# Patient Record
Sex: FEMALE | Race: WHITE
[De-identification: names, ages, dates, MRNs, and addresses within clinical notes are randomized per-mention and may not be internally consistent; named-entity substitution may affect disease eponyms.]

---

## 2022-06-22 ENCOUNTER — HOSPITAL ENCOUNTER (OUTPATIENT)
Dept: HOSPITAL 47 - RADUSWWP | Age: 87
Discharge: HOME | End: 2022-06-22
Attending: FAMILY MEDICINE
Payer: MEDICARE

## 2022-06-22 DIAGNOSIS — R22.42: Primary | ICD-10-CM

## 2022-06-22 NOTE — US
EXAMINATION TYPE: US venous doppler duplex LE LT

 

DATE OF EXAM: 6/22/2022 12:56 PM

 

COMPARISON: NONE

 

CLINICAL HISTORY: R22.42 SWELLING, MASS AND LUMP LT LOWER LIMB. Left leg swelling x 6 months, patient
 taking aspirin

 

SIDE PERFORMED: Left  

 

TECHNIQUE:  The lower extremity deep venous system is examined utilizing real time linear array sonog
yasmin with graded compression, doppler sonography and color-flow sonography.

 

VESSELS IMAGED:

Common Femoral Vein

Deep Femoral Vein

Greater Saphenous Vein *

Femoral Vein

Popliteal Vein

Small Saphenous Vein *

Proximal Calf Veins

(* superficial vessels)

 

 

 

Left Leg:  Appears negative for DVT

 

 

 

IMPRESSION: No evidence for DVT.

## 2023-06-01 ENCOUNTER — HOSPITAL ENCOUNTER (EMERGENCY)
Dept: HOSPITAL 47 - EC | Age: 88
Discharge: ANOTHER HOSPITAL | End: 2023-06-01
Payer: MEDICARE

## 2023-06-01 VITALS — RESPIRATION RATE: 18 BRPM

## 2023-06-01 VITALS — SYSTOLIC BLOOD PRESSURE: 122 MMHG | DIASTOLIC BLOOD PRESSURE: 76 MMHG | HEART RATE: 84 BPM

## 2023-06-01 VITALS — TEMPERATURE: 98.7 F

## 2023-06-01 DIAGNOSIS — R41.82: ICD-10-CM

## 2023-06-01 DIAGNOSIS — I16.1: ICD-10-CM

## 2023-06-01 DIAGNOSIS — J96.00: ICD-10-CM

## 2023-06-01 DIAGNOSIS — I63.9: Primary | ICD-10-CM

## 2023-06-01 DIAGNOSIS — R29.712: ICD-10-CM

## 2023-06-01 LAB
ALBUMIN SERPL-MCNC: 4.3 G/DL (ref 3.5–5)
ALP SERPL-CCNC: 75 U/L (ref 38–126)
ALT SERPL-CCNC: 48 U/L (ref 4–34)
ANION GAP SERPL CALC-SCNC: 10 MMOL/L
APTT BLD: 22.7 SEC (ref 22–30)
AST SERPL-CCNC: 50 U/L (ref 14–36)
BASOPHILS # BLD AUTO: 0 K/UL (ref 0–0.2)
BASOPHILS NFR BLD AUTO: 0 %
BUN SERPL-SCNC: 18 MG/DL (ref 7–17)
CALCIUM SPEC-MCNC: 9.6 MG/DL (ref 8.4–10.2)
CHLORIDE SERPL-SCNC: 101 MMOL/L (ref 98–107)
CK SERPL-CCNC: 49 U/L (ref 30–135)
CO2 SERPL-SCNC: 25 MMOL/L (ref 22–30)
EOSINOPHIL # BLD AUTO: 0.1 K/UL (ref 0–0.7)
EOSINOPHIL NFR BLD AUTO: 1 %
ERYTHROCYTE [DISTWIDTH] IN BLOOD BY AUTOMATED COUNT: 4.75 M/UL (ref 3.8–5.4)
ERYTHROCYTE [DISTWIDTH] IN BLOOD: 12.9 % (ref 11.5–15.5)
GLUCOSE BLD-MCNC: 173 MG/DL (ref 70–110)
GLUCOSE BLD-MCNC: 203 MG/DL (ref 70–110)
GLUCOSE SERPL-MCNC: 187 MG/DL (ref 74–99)
HCT VFR BLD AUTO: 45.8 % (ref 34–46)
HGB BLD-MCNC: 15 GM/DL (ref 11.4–16)
INR PPP: 1.1 (ref ?–1.2)
LYMPHOCYTES # SPEC AUTO: 3.2 K/UL (ref 1–4.8)
LYMPHOCYTES NFR SPEC AUTO: 34 %
MCH RBC QN AUTO: 31.7 PG (ref 25–35)
MCHC RBC AUTO-ENTMCNC: 32.8 G/DL (ref 31–37)
MCV RBC AUTO: 96.6 FL (ref 80–100)
MONOCYTES # BLD AUTO: 0.5 K/UL (ref 0–1)
MONOCYTES NFR BLD AUTO: 6 %
NEUTROPHILS # BLD AUTO: 5.4 K/UL (ref 1.3–7.7)
NEUTROPHILS NFR BLD AUTO: 57 %
PLATELET # BLD AUTO: 193 K/UL (ref 150–450)
POTASSIUM SERPL-SCNC: 3.4 MMOL/L (ref 3.5–5.1)
PROT SERPL-MCNC: 7.9 G/DL (ref 6.3–8.2)
PT BLD: 11.5 SEC (ref 9–12)
SODIUM SERPL-SCNC: 136 MMOL/L (ref 137–145)
WBC # BLD AUTO: 9.6 K/UL (ref 3.8–10.6)

## 2023-06-01 PROCEDURE — 36415 COLL VENOUS BLD VENIPUNCTURE: CPT

## 2023-06-01 PROCEDURE — 70498 CT ANGIOGRAPHY NECK: CPT

## 2023-06-01 PROCEDURE — 80053 COMPREHEN METABOLIC PANEL: CPT

## 2023-06-01 PROCEDURE — 85610 PROTHROMBIN TIME: CPT

## 2023-06-01 PROCEDURE — 99292 CRITICAL CARE ADDL 30 MIN: CPT

## 2023-06-01 PROCEDURE — 82550 ASSAY OF CK (CPK): CPT

## 2023-06-01 PROCEDURE — 31500 INSERT EMERGENCY AIRWAY: CPT

## 2023-06-01 PROCEDURE — 93005 ELECTROCARDIOGRAM TRACING: CPT

## 2023-06-01 PROCEDURE — 85025 COMPLETE CBC W/AUTO DIFF WBC: CPT

## 2023-06-01 PROCEDURE — 71045 X-RAY EXAM CHEST 1 VIEW: CPT

## 2023-06-01 PROCEDURE — 96375 TX/PRO/DX INJ NEW DRUG ADDON: CPT

## 2023-06-01 PROCEDURE — 70496 CT ANGIOGRAPHY HEAD: CPT

## 2023-06-01 PROCEDURE — 96365 THER/PROPH/DIAG IV INF INIT: CPT

## 2023-06-01 PROCEDURE — 70450 CT HEAD/BRAIN W/O DYE: CPT

## 2023-06-01 PROCEDURE — 96361 HYDRATE IV INFUSION ADD-ON: CPT

## 2023-06-01 PROCEDURE — 85730 THROMBOPLASTIN TIME PARTIAL: CPT

## 2023-06-01 PROCEDURE — 99291 CRITICAL CARE FIRST HOUR: CPT

## 2023-06-01 PROCEDURE — 37195 THROMBOLYTIC THERAPY STROKE: CPT

## 2023-06-01 PROCEDURE — 84484 ASSAY OF TROPONIN QUANT: CPT

## 2023-06-01 PROCEDURE — 94002 VENT MGMT INPAT INIT DAY: CPT

## 2023-06-01 RX ADMIN — SODIUM CHLORIDE ONE ML: 900 INJECTION INTRAVENOUS at 14:20

## 2023-06-01 RX ADMIN — SODIUM CHLORIDE ONE: 900 INJECTION INTRAVENOUS at 15:10

## 2023-06-01 NOTE — CT
EXAMINATION TYPE: CT angio head neck

 

DATE OF EXAM: 6/1/2023

 

COMPARISON: CT brain same day

 

HISTORY: 88-year-old female with neurologic deficit, acute, stroke suspected

 

TECHNIQUE: Contiguous axial scanning of the head and neck performed with IV Contrast, patient injecte
d with 75 mL of Isovue 370. Coronal/sagittal MIP reconstructions performed. 3-D reconstructions gener
ated on a dedicated independent workstation.

 

CT DLP: 1669. 4 mGycm

Automated exposure control for dose reduction was used.

 

FINDINGS: 

NECK:

Heart appears mildly enlarged. 1.3 cm right paratracheal node. Some additional borderline size nonenl
arged AP window nodes.

 

Aberrant direct takeoff of the left vertebral artery directly from the aortic arch.

 

The vertebral arteries appear codominant. There is loss of opacification of the upper V3 segment righ
t vertebral artery. Etiology is unclear. This appears to be a gradual loss but without any proximal h
igh-grade stenosis seen. Early take off of the left PICA. The V4 segment left vertebral artery become
s hypoplastic. Possible contribution to the right PICA from the left side.

 

Right common carotid artery is patent. Tortuosity of the proximal right common carotid artery.

Carotid bifurcation is patent. Retropharyngeal course right ICA. Right ICA is patent. NASCET criteria
 was utilized.

 

Left common and left internal carotid arteries are patent.

 

HEAD:

Again, hypoplastic V4 left vertebral artery. There may be a severe stenosis distal V4 left vertebral 
artery. Nonvisualization of the right vertebral artery.

 

Basilar artery is patent. Hypoplastic P1 segment left PCA. Persistent fetal origin left PCA, diffusel
y diminutive but probably patent. 

 

Possible cut off after the proximal portion of the P2 segment right posterior cerebral artery, axial 
image 136.

 

Mild atherosclerotic calcifications in the carotid siphons. There is high-grade stenosis at the bilat
eral M1 segments of the middle cerebral arteries. Diminutive M2 and more distal branches are visualiz
ed on both sides. 3 mm nodular prominence supraclinoid right ICA along the communicating segment.

 

 

 

IMPRESSION: 

THE UPPER CERVICAL RIGHT VERTEBRAL ARTERY, DISTAL V4 SEGMENT LEFT VERTEBRAL ARTERY, AND RIGHT PCA ARE
 MOST CONCERNING FOR ACUTE FINDINGS. CONSIDER THROMBOEMBOLIC PHENOMENON.  RECOMMEND MRI.

 

NECK:

1. GRADUAL LOSS OF OPACIFICATION OF THE UPPER V3 SEGMENT RIGHT VERTEBRAL ARTERY IN THE NECK. 

2. VARIANT DIRECT TAKEOFF OF THE LEFT VERTEBRAL ARTERY DIRECTLY FROM THE AORTIC ARCH.

3. NO SIGNIFICANT STENOSIS OF EITHER COMMON OR INTERNAL CAROTID ARTERIES IN THE NECK. 

 

HEAD:

4. NONVISUALIZATION OF THE V4 SEGMENT RIGHT VERTEBRAL ARTERY SUGGESTING OCCLUSION. THE LEFT VERTEBRAL
 ARTERY BECOMES HYPOPLASTIC AND MAY HAVE A SEVERE DISTAL STENOSIS PRIOR TO THE BASILAR ARTERY. THE RI
GHT PICA MAY HAVE A CONTRIBUTION FROM THE LEFT SIDE.

5. WHILE THE BASILAR ARTERY IS PATENT, THERE IS A SEVERELY DIMINUTIVE PERSISTENT FETAL ORIGIN LEFT PC
A. HOWEVER, FINDINGS ARE CONCERNING FOR RIGHT PCA OCCLUSION JUST AFTER THE P1 SEGMENT. 

6. IN ADDITION, THERE APPEAR TO BE HIGH-GRADE STENOSES AT THE M1 SEGMENTS OF THE BILATERAL MCA's. UNC
LEAR IF THIS IS A CHRONIC FINDING AS THE M2 AND MORE DISTAL BRANCHES ARE VISUALIZED, SEVERELY DIMINUT
EUGENE IN CALIBER.

7. POSSIBLE 3 MM ANEURYSM POSTERIORLY FROM THE SUPRACLINOID RIGHT ICA LIKELY ALONG THE COMMUNICATING 
SEGMENT.

 

CALLED TO THE ER at 1:52 PM.

## 2023-06-01 NOTE — ED
Neuro HPI





- General


Stated Complaint: neuro issues


Time Seen by Provider: 06/01/23 12:44


Source: RN notes reviewed, old records reviewed, Caregiver


Mode of arrival: EMS


Limitations: no limitations





- History of Present Illness


Is the patient presenting with stroke symptoms?: Yes


-: minutes(s)


Initial Comments: 





This is a 88-year-old female to the emergency department today.  Patient's 

brought in by EMS stroke symptoms.  Patient has significant left-sided deficit. 

Elevated blood pressure.  Altered mental status.  No history of stroke.  No 

trauma history.  No blood thinners per history, patient's a poor strain 

currently secondary to clinical condition, history obtained from EMS


Location: speech, dysarthria, left arm, left leg


Place: home


Severity: moderate


Quality: weak, numb, tingling, constant


Worsens With: none


On Anticoagulants: No


Associated Symptoms: confusion, weakness


Treatments Prior to Arrival: none





- Related Data


Allergies/Adverse Reactions: 


                                    Allergies











Allergy/AdvReac Type Severity Reaction Status Date / Time


 


No Known Allergies Allergy   Verified 06/01/23 12:50














Review of Systems


ROS Statement: 


Those systems with pertinent positive or pertinent negative responses have been 

documented in the HPI.





ROS Other: All systems not noted in ROS Statement are negative.





General Exam





- General Exam Comments


Initial Comments: 





NIH 12


Limitations: altered mental status


General appearance: alert, anxious, lethargic, obtunded


Head exam: Present: atraumatic, normocephalic, normal inspection


Eye exam: Present: normal appearance, PERRL, EOMI.  Absent: scleral icterus, 

conjunctival injection, periorbital swelling


ENT exam: Present: normal exam, mucous membranes moist


Neck exam: Present: normal inspection.  Absent: tenderness, meningismus, 

lymphadenopathy


Respiratory exam: Present: normal lung sounds bilaterally.  Absent: respiratory 

distress, wheezes, rales, rhonchi, stridor


Cardiovascular Exam: Present: irregular rhythm, normal heart sounds.  Absent: 

systolic murmur, diastolic murmur, rubs, gallop, clicks


GI/Abdominal exam: Present: soft, normal bowel sounds.  Absent: distended, 

tenderness, guarding, rebound, rigid


Extremities exam: Present: normal inspection, full ROM, normal capillary refill.

 Absent: tenderness, pedal edema, joint swelling, calf tenderness


Back exam: Present: normal inspection


Neurological exam: Present: altered, oriented X3, CN II-XII intact


Psychiatric exam: Present: depressed


Skin exam: Present: warm, dry, intact, normal color.  Absent: rash





Stroke MDM





- Lab Data


Result diagrams: 


                                 06/01/23 12:56





                                 06/01/23 12:56


                                   Lab Results











  06/01/23 06/01/23 06/01/23 Range/Units





  12:45 12:56 12:56 


 


WBC   9.6   (3.8-10.6)  k/uL


 


RBC   4.75   (3.80-5.40)  m/uL


 


Hgb   15.0   (11.4-16.0)  gm/dL


 


Hct   45.8   (34.0-46.0)  %


 


MCV   96.6   (80.0-100.0)  fL


 


MCH   31.7   (25.0-35.0)  pg


 


MCHC   32.8   (31.0-37.0)  g/dL


 


RDW   12.9   (11.5-15.5)  %


 


Plt Count   193   (150-450)  k/uL


 


MPV   8.6   


 


Neutrophils %   57   %


 


Lymphocytes %   34   %


 


Monocytes %   6   %


 


Eosinophils %   1   %


 


Basophils %   0   %


 


Neutrophils #   5.4   (1.3-7.7)  k/uL


 


Lymphocytes #   3.2   (1.0-4.8)  k/uL


 


Monocytes #   0.5   (0-1.0)  k/uL


 


Eosinophils #   0.1   (0-0.7)  k/uL


 


Basophils #   0.0   (0-0.2)  k/uL


 


PT    11.5  (9.0-12.0)  sec


 


INR    1.1  (<1.2)  


 


APTT    22.7  (22.0-30.0)  sec


 


Sodium     (137-145)  mmol/L


 


Potassium     (3.5-5.1)  mmol/L


 


Chloride     ()  mmol/L


 


Carbon Dioxide     (22-30)  mmol/L


 


Anion Gap     mmol/L


 


BUN     (7-17)  mg/dL


 


Creatinine     (0.52-1.04)  mg/dL


 


Est GFR (CKD-EPI)AfAm     (>60 ml/min/1.73 sqM)  


 


Est GFR (CKD-EPI)NonAf     (>60 ml/min/1.73 sqM)  


 


Glucose     (74-99)  mg/dL


 


POC Glucose (mg/dL)  173 H    ()  mg/dL


 


POC Glu Operater ID  Concetta Schuler    


 


Calcium     (8.4-10.2)  mg/dL


 


Total Bilirubin     (0.2-1.3)  mg/dL


 


AST     (14-36)  U/L


 


ALT     (4-34)  U/L


 


Alkaline Phosphatase     ()  U/L


 


Creatine Kinase     ()  U/L


 


Troponin I     (0.000-0.034)  ng/mL


 


Total Protein     (6.3-8.2)  g/dL


 


Albumin     (3.5-5.0)  g/dL














  06/01/23 06/01/23 06/01/23 Range/Units





  12:56 12:56 14:18 


 


WBC     (3.8-10.6)  k/uL


 


RBC     (3.80-5.40)  m/uL


 


Hgb     (11.4-16.0)  gm/dL


 


Hct     (34.0-46.0)  %


 


MCV     (80.0-100.0)  fL


 


MCH     (25.0-35.0)  pg


 


MCHC     (31.0-37.0)  g/dL


 


RDW     (11.5-15.5)  %


 


Plt Count     (150-450)  k/uL


 


MPV     


 


Neutrophils %     %


 


Lymphocytes %     %


 


Monocytes %     %


 


Eosinophils %     %


 


Basophils %     %


 


Neutrophils #     (1.3-7.7)  k/uL


 


Lymphocytes #     (1.0-4.8)  k/uL


 


Monocytes #     (0-1.0)  k/uL


 


Eosinophils #     (0-0.7)  k/uL


 


Basophils #     (0-0.2)  k/uL


 


PT     (9.0-12.0)  sec


 


INR     (<1.2)  


 


APTT     (22.0-30.0)  sec


 


Sodium  136 L    (137-145)  mmol/L


 


Potassium  3.4 L    (3.5-5.1)  mmol/L


 


Chloride  101    ()  mmol/L


 


Carbon Dioxide  25    (22-30)  mmol/L


 


Anion Gap  10    mmol/L


 


BUN  18 H    (7-17)  mg/dL


 


Creatinine  0.73    (0.52-1.04)  mg/dL


 


Est GFR (CKD-EPI)AfAm  86    (>60 ml/min/1.73 sqM)  


 


Est GFR (CKD-EPI)NonAf  75    (>60 ml/min/1.73 sqM)  


 


Glucose  187 H    (74-99)  mg/dL


 


POC Glucose (mg/dL)    203 H  ()  mg/dL


 


POC Glu Operater ID    Abundio Isbell  


 


Calcium  9.6    (8.4-10.2)  mg/dL


 


Total Bilirubin  1.8 H    (0.2-1.3)  mg/dL


 


AST  50 H    (14-36)  U/L


 


ALT  48 H    (4-34)  U/L


 


Alkaline Phosphatase  75    ()  U/L


 


Creatine Kinase  49    ()  U/L


 


Troponin I   <0.012   (0.000-0.034)  ng/mL


 


Total Protein  7.9    (6.3-8.2)  g/dL


 


Albumin  4.3    (3.5-5.0)  g/dL














- NIH Stroke Scale


1a. Level of Consciousness: (3) responds reflex/autonomic


1b. LOC Questions: (1) answers 1 question correctly


1c. LOC Commands: (1) performs 1 task correctly


2. Best Gaze: (2) forced deviation


3. Visual: (1) partial hemianopia


4. Facial Palsy: (1) minor paralysis


5a. Motor Arm Left: (3) no gravity effort


5b. Motor Arm Right: (0) no drift


6a. Motor Leg Left: (3) no gravity effort


6b. Motor Leg Right: (0) no drift


7. Limb Ataxia: (2) present 2 limbs


8. Sensory: (1) mild/moderate sensory loss


9. Best Language: (0) no aphasia


10. Dysarthria: (0) normal


11. Extinction/Inattention: (0) no abnormality





- Thrombolytic Inclusion/Exclusion


Thrombolytic Inclusion Criteria: Symptom Onset < 4.5 h, NIH Stroke Scale Deficit





- Medical Decision Making





88 female DF for evaluation of significant neural and cell, elevated blood 

pressure hypertensive emergency CVA.  Patient given tPA will be transferred for 

neuro intervention regarding MCA CVA





- Radiology Data


Radiology results: report reviewed, image reviewed





- EKG Data


-: EKG Interpreted by Me (EKG is A. fib 67 QRS 94 )





Course


                                   Vital Signs











  06/01/23 06/01/23 06/01/23





  12:46 12:50 13:02


 


Temperature 98.7 F 98.7 F 


 


Pulse Rate 83 66 76


 


Respiratory 16 16 18





Rate   


 


Blood Pressure 192/104 196/116 188/127


 


O2 Sat by Pulse 96 95 96





Oximetry   


 


Fraction of   





Inspired Oxygen   





(FIO2)   














  06/01/23 06/01/23 06/01/23





  13:05 13:10 13:20


 


Temperature   


 


Pulse Rate 66 70 75


 


Respiratory 16 16 18





Rate   


 


Blood Pressure 196/92 168/100 189/105


 


O2 Sat by Pulse 95 94 L 94 L





Oximetry   


 


Fraction of   





Inspired Oxygen   





(FIO2)   














  06/01/23 06/01/23 06/01/23





  13:35 13:50 14:05


 


Temperature   


 


Pulse Rate 71 78 80


 


Respiratory 18 18 18





Rate   


 


Blood Pressure 175/109 163/98 156/87


 


O2 Sat by Pulse 94 L 94 L 97





Oximetry   


 


Fraction of   





Inspired Oxygen   





(FIO2)   














  06/01/23 06/01/23 06/01/23





  14:13 14:21 14:47


 


Temperature   


 


Pulse Rate 70 89 85


 


Respiratory 4 L  18





Rate   


 


Blood Pressure 134/94 156/94 111/66


 


O2 Sat by Pulse 65 L 91 L 99





Oximetry   


 


Fraction of  100 





Inspired Oxygen   





(FIO2)   














  06/01/23 06/01/23 06/01/23





  14:48 14:59 15:06


 


Temperature   


 


Pulse Rate  87 84


 


Respiratory  18 18





Rate   


 


Blood Pressure  116/69 122/76


 


O2 Sat by Pulse  98 99





Oximetry   


 


Fraction of 100  





Inspired Oxygen   





(FIO2)   














- Reevaluation(s)


Reevaluation #1: 





06/01/23 12:45


Medical record is reviewed





Code stroke on patient arrival


Reevaluation #2: 





06/01/23 13:24


TPA is delayed secondary to blood pressure control, patient significantly 

hypertensive on arrival and remains hypertensive after CT





06/01/23 14:30


Patient has significant change in clinical symptoms prior to transfer became 

unresponsive and apneic with significantly low respiratory rate did not lose 

pulse.  Patient was able to be ventilated and increased supplemental O2 to 

improve pulse oxygenation mental status does not improve


Blood sugar was checked normal, high normal





Spoke with family at length regarding end-of-life wishes, decision made to make 

patient full code, patient will be in with a for airway protection, altered 

mental status, prolonged apneic episodes with hypoxia





Patient was intubated secondary to airway protection


Reevaluation #3: 





06/01/23 13:58


She has no change in symptoms here in the ER





Family informed of findings and results


Reevaluation #4: 





06/01/23 12:45


Was pt. sent in by a medical professional or institution?


@  -no


Did you speak to anyone other than the patient for history?  


@  -no


Did you review nursing and triage notes? 


@  -agree


Were old charts reviewed? 


@  -no


Differential Diagnosis? 


@  -prior


EKG interpreted by me (3pts min.)?


@  -yes


X-rays interpreted by me (1pt min.)?


@  -yes


CT interpreted by me (1pt min.)?


@  -no


U/S interpreted by me (1pt. min.)?


@  -no


What testing was considered but not performed? (CT, X-rays, U/S, labs)? Why?


@  -no


What meds were considered but not given? Why?


@  -no


Did you discuss the management of the patient with other professionals?


@  -no


Did you reconcile home meds?


@  -no


Was smoking cessation discussed for >3mins.?


@  -no


Was critical care preformed (if so, how long)?


@  -no


Were there social determinants of health that impacted care today? How? 

(Homelessness, low income, unemployed, alcoholism, drug addiction, 

transportation, low edu. Level, literacy, decrease access to med. care, snf, 

rehab)?


@  -no


Was there de-escalation of care discussed even if they declined? (Discuss DNR or

withdrawal of care, Hospice)?


@  -no


What co-morbidities impacted this encounter? (DM, HTN, Smoking, COPD, CAD, 

Cancer, CVA, Hep., AIDS, mental health diagnosis, sleep apnea, morbid obesity)?


@  -none


Was patient admitted / discharged?


@  -88 female to be admitted for CVA, patient be transferred for inpatient 

treatment of CVA, embolism


Admitted, transferred


Undiagnosed new problem with uncertain prognosis?


@  -no


Drug Therapy requiring intensive monitoring for toxicity (Heparin, Nitro, 

Insulin, Cardizem)?


@  -no


Were any procedures done?


@  -Intubation


Diagnosis/symptom?


@  -CVA


Acute, or Chronic, or Acute on Chronic?


@  -no


Uncomplicated (without systemic symptoms) or Complicated (systemic symptoms)?


@  -uncomplicated


Side effects of treatment?


@  -no


Exacerbation, Progression, or Severe Exacerbation]


@  -no


Poses a threat to life or bodily function?


@  -Gyes with CVA





Reevaluation #5: 





06/01/23 12:45


Differential Altered Mental Status:


Hypoglycemia, DKA, hypercapnia, ETOH, overdose, CO poisoning, trauma, myxedema 

coma, HTN encephalopathy, infection, encephalitis, psychosis, intercranial 

hemorrhage, hepatic encephalopathy, meningitis, CVA, this is not meant to be an 

all-inclusive list








- Consultations


Consultation #1: 





Spoke with neuro interventional Dr. Reyes on initial evaluation of patient as 

well as pending of CT reports, after patient was given tPA patient is found to 

be candidate for neuro intervention and will be transferred to [facility]





Patient is accepted in transfer





Procedures





- Intubation


Sedative: Versed


Laryngoscope: Hemal


Size: 4


Assist Device Used: Bougie


ET Tube Size: 7


ET Tube Uncuffed: No


Tube Secured Location: lips


Tube Placement Confirmation: visualized tube passing through cords, equal breath

sounds bilaterally, no breath sounds over epigastrium, confirmation by 

capnometry


Patient Tolerated Procedure: well


Intubation Complications: none





Critical Care Time


Critical Care Time: Yes


Total Critical Care Time: 95





Disposition


Clinical Impression: 


 Altered mental status, CVA (cerebral vascular accident), Hypertensive 

emergency, Acute respiratory failure





Disposition: TRANSFER TO SHORT TERM HOSP


Condition: Critical


Is patient prescribed a controlled substance at d/c from ED?: No


Referrals: 


Miles Cunningham DO [Primary Care Provider] - 1-2 days


Time of Disposition: 13:10





- Out of Hospital Transfer - Req. Specs


Out of Hospital Transfer - Requested Specifics: Neurological ICU ([facility])

## 2023-06-01 NOTE — CT
EXAMINATION TYPE: CT brain wo con for TPA

 

DATE OF EXAM: 6/1/2023

 

COMPARISON: None

 

HISTORY: 88-year-old female neurologic deficit, acute, stroke suspected, CODE ALTEPLASE

 

TECHNIQUE:  Examination was done in axial plane without intravenous contrast.  Coronal and sagittal r
econstructions performed.

 

CT DLP: 1155.4 mGycm

Automated exposure control for dose reduction was used.

 

FINDINGS:

There is no evidence of  acute intracranial hemorrhage, acute ischemic changes, mass, mass-effect, or
 extra-axial fluid collection.  There is no effacement of cerebral sulci or basal subarachnoid cister
ns.  There is no hydrocephalus.  There is no midline shift.  Gray-white matter distinction is preserv
ed.

 

Benign hyperostosis frontalis interna paranasal sinuses and mastoid air cells well pneumatized. Orbit
s and globes are intact.

 

 

IMPRESSION:

No acute intracranial abnormality seen.

## 2023-06-01 NOTE — XR
EXAMINATION TYPE: XR chest 1V portable

 

DATE OF EXAM: 6/1/2023

 

COMPARISON: NONE

 

HISTORY: Intubation

 

TECHNIQUE: Single frontal view of the chest is obtained.

 

FINDINGS:  ET tube 4 cm above tal. NG tube in the level of the gastric fundus. Heart is enlarged a
nd there is coarsened interstitium. Atherosclerotic change aorta.

No evidence of  pleural effusion or pneumothorax.There is a left perihilar area of increased density 
which could be evaluated on subsequent x-rays as the patient's condition tolerates to assess for cons
olidation or mass.

 

 

IMPRESSION:  

1. ET tube appears in good position.

2. Correlate for chronic interstitial pulmonary fibrosis UIP type. There is a left perihilar area of 
increased density which could be evaluated on subsequent x-rays as the patient's condition tolerates 
to assess for consolidation or mass.

## 2023-06-01 NOTE — CT
EXAMINATION TYPE: CT brain wo con

 

DATE OF EXAM: 6/1/2023

 

COMPARISON: 6/1/2023

 

HISTORY: ams

 

CT DLP: 1080.3 mGycm

Automated exposure control for dose reduction was used.

 

FINDINGS: 

No acute hemorrhage. There remains a hyperdense area within the right vertebral artery which is suspi
cious for acute thrombosis. Mild generalized degenerative change. There is  sulcal effacement right p
osterior temporal and occipital lobes. No midline shift or mass effect.

 

Orbits are symmetric. Calvarium intact. Craniocervical junction maintained. Hyperostosis of the front
al bone incidentally noted.

 

IMPRESSION: 

1. THERE IS SULCAL EFFACEMENT RIGHT POSTERIOR TEMPORAL AND OCCIPITAL LOBES SUGGESTIVE OF ACUTE ISCHEM
IA. NO ACUTE HEMORRHAGE. 

2. ABNORMAL CTA FINDINGS LESS APPARENT BY NONCONTRAST STANDARD HEAD CT TECHNIQUE. THERE DOES APPEAR T
O BE A HYPERDENSE RIGHT VERTEBRAL ARTERY AND ACUTE THROMBOSIS IN THE DIFFERENTIAL DIAGNOSIS. OTHER AB
NORMALITIES NOTED BY CTA ARE LESS APPARENT BY STANDARD NONCONTRAST CT HEAD.